# Patient Record
Sex: FEMALE
[De-identification: names, ages, dates, MRNs, and addresses within clinical notes are randomized per-mention and may not be internally consistent; named-entity substitution may affect disease eponyms.]

---

## 2022-05-03 ENCOUNTER — HOSPITAL ENCOUNTER (OUTPATIENT)
Dept: HOSPITAL 5 - TRG | Age: 22
Discharge: HOME | End: 2022-05-03
Attending: OBSTETRICS & GYNECOLOGY
Payer: SELF-PAY

## 2022-05-03 VITALS — DIASTOLIC BLOOD PRESSURE: 58 MMHG | SYSTOLIC BLOOD PRESSURE: 111 MMHG

## 2022-05-03 DIAGNOSIS — Z34.92: Primary | ICD-10-CM

## 2022-05-03 DIAGNOSIS — Z3A.20: ICD-10-CM

## 2022-05-03 LAB
BILIRUB UR QL STRIP: (no result)
BLOOD UR QL VISUAL: (no result)
PH UR STRIP: 8 [PH] (ref 5–7)
PROT UR STRIP-MCNC: (no result) MG/DL
UROBILINOGEN UR-MCNC: < 2 MG/DL (ref ?–2)
WBC #/AREA URNS HPF: < 1 /HPF (ref 0–6)

## 2022-05-03 PROCEDURE — 81001 URINALYSIS AUTO W/SCOPE: CPT

## 2022-05-03 PROCEDURE — 59025 FETAL NON-STRESS TEST: CPT

## 2022-08-15 ENCOUNTER — HOSPITAL ENCOUNTER (OUTPATIENT)
Dept: HOSPITAL 5 - TRG | Age: 22
Discharge: HOME | End: 2022-08-15
Attending: OBSTETRICS & GYNECOLOGY
Payer: SELF-PAY

## 2022-08-15 VITALS — SYSTOLIC BLOOD PRESSURE: 111 MMHG | DIASTOLIC BLOOD PRESSURE: 80 MMHG

## 2022-08-15 DIAGNOSIS — Z3A.35: ICD-10-CM

## 2022-08-15 DIAGNOSIS — O46.93: Primary | ICD-10-CM

## 2022-08-15 LAB
PH UR STRIP: 5 [PH] (ref 5–7)
PROT UR STRIP-MCNC: (no result) MG/DL
RBC #/AREA URNS HPF: 1 /HPF (ref 0–6)
UROBILINOGEN UR-MCNC: > 2 MG/DL (ref ?–2)
WBC #/AREA URNS HPF: 1 /HPF (ref 0–6)

## 2022-08-15 PROCEDURE — 76815 OB US LIMITED FETUS(S): CPT

## 2022-08-15 PROCEDURE — 81001 URINALYSIS AUTO W/SCOPE: CPT

## 2022-08-15 NOTE — ULTRASOUND REPORT
Limited abdominal ultrasound



INDICATION: Vaginal bleeding



FINDINGS: Single live intrauterine pregnancy in cephalic position. No abruption is seen. No lytic no 
separation of the placenta. Placenta is anterior left lateral. Grade 2. Fetal heart rate is 1 38 bpm.




IMPRESSION:

Single live intrauterine prior exam. No placental abruption is seen



Signer Name: Yunier Saldivar MD 

Signed: 8/15/2022 10:58 PM

Workstation Name: [facility]-

## 2022-08-25 ENCOUNTER — HOSPITAL ENCOUNTER (INPATIENT)
Dept: HOSPITAL 5 - TRG | Age: 22
LOS: 3 days | Discharge: HOME | End: 2022-08-28
Attending: OBSTETRICS & GYNECOLOGY | Admitting: OBSTETRICS & GYNECOLOGY
Payer: SELF-PAY

## 2022-08-25 DIAGNOSIS — F17.210: ICD-10-CM

## 2022-08-25 DIAGNOSIS — Z20.822: ICD-10-CM

## 2022-08-25 DIAGNOSIS — O34.211: ICD-10-CM

## 2022-08-25 DIAGNOSIS — Z3A.37: ICD-10-CM

## 2022-08-25 DIAGNOSIS — D72.829: ICD-10-CM

## 2022-08-25 LAB
HCT VFR BLD CALC: 27.1 % (ref 30.3–42.9)
HCT VFR BLD CALC: 36.2 % (ref 30.3–42.9)
HGB BLD-MCNC: 12.1 GM/DL (ref 10.1–14.3)
HGB BLD-MCNC: 9 GM/DL (ref 10.1–14.3)
MCHC RBC AUTO-ENTMCNC: 33 % (ref 30–34)
MCV RBC AUTO: 89 FL (ref 79–97)
PLATELET # BLD: 307 K/MM3 (ref 140–440)
RBC # BLD AUTO: 4.06 M/MM3 (ref 3.65–5.03)

## 2022-08-25 PROCEDURE — 36415 COLL VENOUS BLD VENIPUNCTURE: CPT

## 2022-08-25 PROCEDURE — 85018 HEMOGLOBIN: CPT

## 2022-08-25 PROCEDURE — 0KQM0ZZ REPAIR PERINEUM MUSCLE, OPEN APPROACH: ICD-10-PCS | Performed by: ADVANCED PRACTICE MIDWIFE

## 2022-08-25 PROCEDURE — 86901 BLOOD TYPING SEROLOGIC RH(D): CPT

## 2022-08-25 PROCEDURE — 86762 RUBELLA ANTIBODY: CPT

## 2022-08-25 PROCEDURE — U0003 INFECTIOUS AGENT DETECTION BY NUCLEIC ACID (DNA OR RNA); SEVERE ACUTE RESPIRATORY SYNDROME CORONAVIRUS 2 (SARS-COV-2) (CORONAVIRUS DISEASE [COVID-19]), AMPLIFIED PROBE TECHNIQUE, MAKING USE OF HIGH THROUGHPUT TECHNOLOGIES AS DESCRIBED BY CMS-2020-01-R: HCPCS

## 2022-08-25 PROCEDURE — 86850 RBC ANTIBODY SCREEN: CPT

## 2022-08-25 PROCEDURE — 86706 HEP B SURFACE ANTIBODY: CPT

## 2022-08-25 PROCEDURE — 85025 COMPLETE CBC W/AUTO DIFF WBC: CPT

## 2022-08-25 PROCEDURE — 88307 TISSUE EXAM BY PATHOLOGIST: CPT

## 2022-08-25 PROCEDURE — 85027 COMPLETE CBC AUTOMATED: CPT

## 2022-08-25 PROCEDURE — 80307 DRUG TEST PRSMV CHEM ANLYZR: CPT

## 2022-08-25 PROCEDURE — 86900 BLOOD TYPING SEROLOGIC ABO: CPT

## 2022-08-25 PROCEDURE — 85007 BL SMEAR W/DIFF WBC COUNT: CPT

## 2022-08-25 PROCEDURE — 85014 HEMATOCRIT: CPT

## 2022-08-25 PROCEDURE — 87806 HIV AG W/HIV1&2 ANTB W/OPTIC: CPT

## 2022-08-25 PROCEDURE — 86592 SYPHILIS TEST NON-TREP QUAL: CPT

## 2022-08-25 RX ADMIN — DOCUSATE SODIUM SCH MG: 100 CAPSULE, LIQUID FILLED ORAL at 21:00

## 2022-08-25 RX ADMIN — IBUPROFEN SCH MG: 800 TABLET, FILM COATED ORAL at 18:12

## 2022-08-25 NOTE — HISTORY AND PHYSICAL REPORT
History of Present Illness


Date of examination: 22


Date of admission: 


2022


Chief complaint: 


Intense Labor Pains








History of present illness: 


No Prenatal Care








Past History


Past Medical History: no pertinent history


Past Surgical History:  section (x2)


Family/Genetic History: heart disease (Mother)


Social history: single, smoking (3-4 Cigarettes daily; and smokes THC every 

other day)





- Obstetrical History


Expected Date of Delivery: 22


Actual Gestation: 37 Week(s) 1 Day(s) 


: 3


Para: 2


Hx # Term Pregnancies: 2


Number of Living Children: 2





Medications and Allergies


                                    Allergies











Allergy/AdvReac Type Severity Reaction Status Date / Time


 


No Known Allergies Allergy   Verified 08/15/22 15:10











                                Home Medications











 Medication  Instructions  Recorded  Confirmed  Last Taken  Type


 


No Known Home Medications [No  08/15/22 08/15/22 Unknown History





Reported Home Medications]     











Active Meds: 


Active Medications





Butorphanol Tartrate (Butorphanol 2 Mg/1 Ml Inj)  1 mg IV Q2H PRN


   PRN Reason: Pain, Moderate(4-6) LABOR PAIN


Ephedrine Sulfate (Ephedrine Sulfate 50 Mg/1 Ml Inj)  10 mg IV Q2M PRN


   PRN Reason: Hypotension


Lactated Ringer's (Lactated Ringers)  1,000 mls @ 125 mls/hr IV AS DIRECT ROSEMARIE


Oxytocin/Sodium Chloride (Pitocin/Ns 30 Unit/500ml)  30 units in 500 mls @ 40 

mls/hr IV TITR ROSEMARIE; Protocol


Cefazolin Sodium 2 gm/ Sodium (Chloride)  100 mls @ 200 mls/hr IV ONCE ONE; 

Protocol


   Stop: 22 11:31


Lidocaine (Lidocaine (2%) 20 Mg/1 Ml Vial 20 Ml Mdv)  20 ml INFILTRATI ONCE ONE


   Stop: 22 11:03


Methylergonovine Maleate (Methylergonovine Maleate 0.2 Mg/Ml Vial)  0.2 mg IM 

ONCE PRN


   PRN Reason: Uterine Bleeding


Mineral Oil (Mineral Oil 30 Ml Oral Liqd)  30 ml PO QHS PRN


   PRN Reason: Constipation


Misoprostol (Misoprostol 200 Mcg Tab)  800 mcg WA ONCE PRN


   PRN Reason: Uterine Bleeding


Oxytocin (Oxytocin 10 Unit/1 Ml Inj)  10 unit IM ONCE PRN


   PRN Reason: Uterine Bleeding


Terbutaline Sulfate (Terbutaline 1 Mg/1 Ml Inj)  0.25 mg SUB-Q ONCE PRN


   PRN Reason: Hyperstimulation/Hypertonicity











Review of Systems


Gastrointestinal: constipation





- Vital Signs


Vital signs: 


                                   Vital Signs











Pulse BP


 


 105 H  119/77 


 


 22 09:18  22 09:18








                                        











Temp Pulse Resp BP Pulse Ox


 


 98.3 F   75   16   113/56   97 


 


 22 10:20  22 11:10  22 10:20  22 11:07  22 11:10














- Physical Exam


Breasts: Positive: normal


Cardiovascular: Regular rate


Lungs: Positive: Clear to auscultation, Normal air movement


Abdomen: Positive: normal appearance, soft


Genitourinary (Female): Positive: normal external genitalia, normal perenium


Vagina: Positive: normal moisture


Uterus: Positive: enlarged


Anus/Rectum: Positive: normal perianal skin





- Obstetrical


FHR: other


Uterine Contraction Monitor Mode: External


Cervical Dilatation: 6 (leaking a small amount of clear fluid)


Cervical Effacement Percentage: 100


Fetal station: 0


Uterine Contraction Pattern: Regular


Uterine Tone Measurement Phase: Resting


Uterine Contraction Intensity: Strong/Firm





Results


All other labs normal.








Assessment and Plan


A: IUP @ 37 1/7 Weeks


     Active Labor


     SROM


     GBS Unknown


     No Prenatal Care


     Previous  x 2





P: Admit to L&D Per Routine Orders


    Walk-In Labs


    Ancef 2G X 1 dose


    Anticipate

## 2022-08-25 NOTE — PROCEDURE NOTE
OB Delivery Note





- Delivery


Date of Delivery: 22 (0938)


Surgeon: INDRA HOGAN


Estimated blood loss: 500cc





- Vaginal


Delivery presentation: vertex


Delivery position: OA


Intrapartum events: no prenatal care, mult.variable deceleratio


Delivery induction: none


Delivery monitor: external FHT, external uterine


Route of delivery: 


Delivery placenta: spontaneous, uterine exploration


Delivery cord: 3 umbilical vessels


Episiotomy: none


Delivery laceration: 2nd degree, vaginal side wall


Delivery repair: vicryl


Anesthesia: local


Delivery comments: 


 of a live 6'6 male infant over bilateral 2nd degree vaginal lacerations and

a right labial laceration without pain control with Apgars of 8 and 9 at 0938 on

2022.  Infant directly to maternal abd/chest, skin to skin contact.  

Spontaneous delivery of placenta with [place] side presenting at 0943.  Delayed 

cord clamping and cutting; Cord cut by the Father of the Baby.  Large amount of 

trailing membranes removed with ring forceps.  Uterine exploration; several 

other trailing membranes and large blood clots removed from lower uterine 

segment.  800mcg of Cytotec placed per rectum, Methergine 0.2mg given IM, and 

Vigorous external uterine massage, and Fundus is firm and midline and located 4 

below the U. Labial and vaginal wall lacerations repaired with 3-0 and 2-0 

Vicryl on a CT-1 under local 2% Lidocaine.  Bladder emptied with In and Out 

Catheter (100cc of light yellow urine voided). Lochia is scant. Cord blood 

collected. Placenta to Pathology. 








- Infant


  ** A


Apgar at 1 minute: 8


Apgar at 5 minutes: 9


Infant Gender: Male (6'6)

## 2022-08-26 LAB
BASOPHILS # (AUTO): 0 K/MM3 (ref 0–0.1)
BASOPHILS NFR BLD AUTO: 0.2 % (ref 0–1.8)
BENZODIAZEPINES SCREEN,URINE: (no result)
EOSINOPHIL # BLD AUTO: 0.1 K/MM3 (ref 0–0.4)
EOSINOPHIL NFR BLD AUTO: 0.6 % (ref 0–4.3)
HCT VFR BLD CALC: 26.4 % (ref 30.3–42.9)
HGB BLD-MCNC: 8.7 GM/DL (ref 10.1–14.3)
LYMPHOCYTES # BLD AUTO: 2.6 K/MM3 (ref 1.2–5.4)
LYMPHOCYTES NFR BLD AUTO: 14.8 % (ref 13.4–35)
MCHC RBC AUTO-ENTMCNC: 33 % (ref 30–34)
MCV RBC AUTO: 89 FL (ref 79–97)
METHADONE SCREEN,URINE: (no result)
MONOCYTES # (AUTO): 0.9 K/MM3 (ref 0–0.8)
MONOCYTES % (AUTO): 5.1 % (ref 0–7.3)
OPIATE SCREEN,URINE: (no result)
PLATELET # BLD: 229 K/MM3 (ref 140–440)
RBC # BLD AUTO: 2.96 M/MM3 (ref 3.65–5.03)

## 2022-08-26 RX ADMIN — Medication SCH EACH: at 11:27

## 2022-08-26 RX ADMIN — IBUPROFEN SCH MG: 800 TABLET, FILM COATED ORAL at 00:25

## 2022-08-26 RX ADMIN — AMOXICILLIN AND CLAVULANATE POTASSIUM SCH EACH: 875; 125 TABLET, FILM COATED ORAL at 16:53

## 2022-08-26 RX ADMIN — FERROUS SULFATE TAB 325 MG (65 MG ELEMENTAL FE) SCH MG: 325 (65 FE) TAB at 16:54

## 2022-08-26 RX ADMIN — DOCUSATE SODIUM SCH MG: 100 CAPSULE, LIQUID FILLED ORAL at 11:27

## 2022-08-26 RX ADMIN — IBUPROFEN SCH MG: 800 TABLET, FILM COATED ORAL at 11:27

## 2022-08-26 NOTE — PROGRESS NOTE
Assessment and Plan


PPD#1  with mild fundal tenderness, afebrile with leucocytosis and hgb 

decreasing and pt asymptomatic.


1. Give Augmentin bid


2. Repeat CBC in AM and start ferrous sulfate bid now


3. Routine postpartum care


All questions encouraged and answered





Subjective


Date of service: 22


Principal diagnosis: PPD#1  with prev c/s x2


Interval history: 


pt has no complaints.  Vag bleed less than a period and pain controlled with 

meds.  pt is breast feeding. 





Objective





- Constitutional


Vitals: 


                               Vital Signs - 12hr











  22





  04:13 08:20


 


Temperature 98.0 F 98.1 F


 


Pulse Rate 82 70


 


Respiratory 19 18





Rate  


 


Blood Pressure 116/62 105/50


 


O2 Sat by Pulse 100 100





Oximetry  











General appearance: Present: no acute distress





- Neck


Neck: normal ROM





- Respiratory


Respiratory effort: normal





- Breasts


Breasts: deferred





- Cardiovascular


Rhythm: regular


Extremities: No edema





- Gastrointestinal


General gastrointestinal: Present: soft, non-tender





- Genitourinary


Female genitourinary: other (fundus with mild tenderness, and 1cm below 

umbilicus; lochia moderate)





- Integumentary


Integumentary: warm, dry





- Neurologic


Neurologic: moves all extremities





- Psychiatric


Psychiatric: cooperative





- Labs


CBC & Chem 7: 


                                 22 09:52





Labs: 


                              Abnormal lab results











  22 Range/Units





  11:02 22:53 09:52 


 


WBC  17.1 H   17.7 H  (4.5-11.0)  K/mm3


 


RBC    2.96 L  (3.65-5.03)  M/mm3


 


Hgb   9.0 L D  8.7 L  (10.1-14.3)  gm/dl


 


Hct   27.1 L D  26.4 L  (30.3-42.9)  %


 


Mono # (Auto)    0.9 H  (0.0-0.8)  K/mm3


 


Seg Neutrophils %    79.3 H  (40.0-70.0)  %


 


Seg Neutrophils #    14.0 H  (1.8-7.7)  K/mm3














Medications & Allergies





- Medications


Allergies/Adverse Reactions: 


                                    Allergies





No Known Allergies Allergy (Verified 08/15/22 15:10)


   








Home Medications: 


                                Home Medications











 Medication  Instructions  Recorded  Confirmed  Last Taken  Type


 


No Known Home Medications [No  08/15/22 08/15/22 Unknown History





Reported Home Medications]     











Active Medications: 














Generic Name Dose Route Start Last Admin





  Trade Name Freq  PRN Reason Stop Dose Admin


 


Hydrocodone Bitart/Acetaminophen  2 each  22 11:08 





  Hydrocodone/Acetaminophen 5-325 Mg Tab  PO  





  Q6H PRN  





  Pain, Moderate (4-6)  


 


Benzocaine/Menthol  1 spray  22 11:08  22 20:00





  Benzocaine/Menthol 20/0.5% Top Spray 56 Gm  TP   1 spray





  PRN PRN   Administration





  Episiotomy Pain  


 


Bisacodyl  10 mg  22 11:08 





  Bisacodyl 10 Mg Rect Supp  MS  





  BID PRN  





  Constipation  


 


Diphenhydramine HCl  25 mg  22 11:08 





  Diphenhydramine 25 Mg Cap  PO  





  Q6H PRN  





  Itching  


 


Docusate Sodium  100 mg  22 22:00  22 11:27





  Docusate Sodium 100 Mg Cap  PO   100 mg





  BID ROSEMARIE   Administration


 


Ibuprofen  800 mg  22 12:00  22 11:27





  Ibuprofen 800 Mg Tab  PO   800 mg





  Q6H ROSEMARIE   Administration


 


Magnesium Hydroxide  30 ml  22 11:08 





  Magnesium Hydroxide (Mom) Oral Liqd Udc  PO  





  HS PRN  





  Constipation  


 


Multi-Ingredient Ointment  1 applic  22 11:08 





  Lanolin/Zinc/Dimethicone (Lansinoh) 7 Gm  TP  





  PRN PRN  





  Sore Nipples  


 


Multivitamins/Iron/Calcium  1 each  22 10:00  22 11:27





  Prenatal Zip38-Ty Fumarate-Folic Acid Vit Tab  PO   1 each





  QDAY ROSEMARIE   Administration


 


Promethazine HCl  25 mg  22 11:08 





  Promethazine 25 Mg Rect Supp  MS  





  Q6H PRN  





  Nausea And Vomiting  


 


Sodium Chloride  10 ml  22 12:00 





  Sodium Chloride 0.9% 10 Ml Flush Syringe  IV  





  PRN ROSEMARIE  


 


Witch Hazel/Glycerin  1 each  22 11:08  22 20:00





  Witch Hazel/ Glycerin Pad  TP   1 each





  PRN PRN   Administration





  Hemorrhoid/cleansing/soothing

## 2022-08-27 LAB
ANISOCYTOSIS BLD QL SMEAR: (no result)
BAND NEUTROPHILS # (MANUAL): 0 K/MM3
HCT VFR BLD CALC: 27 % (ref 30.3–42.9)
HGB BLD-MCNC: 8.8 GM/DL (ref 10.1–14.3)
MCHC RBC AUTO-ENTMCNC: 33 % (ref 30–34)
MCV RBC AUTO: 92 FL (ref 79–97)
MYELOCYTES # (MANUAL): 0 K/MM3
PLATELET # BLD: 280 K/MM3 (ref 140–440)
PROMYELOCYTES # (MANUAL): 0 K/MM3
RBC # BLD AUTO: 2.95 M/MM3 (ref 3.65–5.03)
TOTAL CELLS COUNTED BLD: 100

## 2022-08-27 RX ADMIN — AMOXICILLIN AND CLAVULANATE POTASSIUM SCH EACH: 875; 125 TABLET, FILM COATED ORAL at 10:29

## 2022-08-27 RX ADMIN — Medication SCH EACH: at 10:29

## 2022-08-27 RX ADMIN — IBUPROFEN SCH MG: 800 TABLET, FILM COATED ORAL at 22:14

## 2022-08-27 RX ADMIN — DOCUSATE SODIUM SCH MG: 100 CAPSULE, LIQUID FILLED ORAL at 10:29

## 2022-08-27 RX ADMIN — AMOXICILLIN AND CLAVULANATE POTASSIUM SCH EACH: 875; 125 TABLET, FILM COATED ORAL at 22:13

## 2022-08-27 RX ADMIN — IBUPROFEN SCH: 800 TABLET, FILM COATED ORAL at 18:03

## 2022-08-27 RX ADMIN — FERROUS SULFATE TAB 325 MG (65 MG ELEMENTAL FE) SCH MG: 325 (65 FE) TAB at 02:29

## 2022-08-27 RX ADMIN — FERROUS SULFATE TAB 325 MG (65 MG ELEMENTAL FE) SCH MG: 325 (65 FE) TAB at 22:14

## 2022-08-27 RX ADMIN — DOCUSATE SODIUM SCH MG: 100 CAPSULE, LIQUID FILLED ORAL at 22:12

## 2022-08-27 RX ADMIN — IBUPROFEN SCH MG: 800 TABLET, FILM COATED ORAL at 08:44

## 2022-08-27 RX ADMIN — AMOXICILLIN AND CLAVULANATE POTASSIUM SCH EACH: 875; 125 TABLET, FILM COATED ORAL at 02:28

## 2022-08-27 RX ADMIN — IBUPROFEN SCH: 800 TABLET, FILM COATED ORAL at 15:20

## 2022-08-27 RX ADMIN — FERROUS SULFATE TAB 325 MG (65 MG ELEMENTAL FE) SCH MG: 325 (65 FE) TAB at 10:30

## 2022-08-27 RX ADMIN — DOCUSATE SODIUM SCH MG: 100 CAPSULE, LIQUID FILLED ORAL at 02:29

## 2022-08-27 RX ADMIN — IBUPROFEN SCH MG: 800 TABLET, FILM COATED ORAL at 02:28

## 2022-08-27 RX ADMIN — IBUPROFEN SCH: 800 TABLET, FILM COATED ORAL at 23:00

## 2022-08-27 NOTE — PROGRESS NOTE
Assessment and Plan





A: PPD # 2 - elevated WBC's


    On Augmentin


P: CBC pending


    Continue PP Care





Subjective





- Subjective


Date of service: 22


Principal diagnosis: PPD#2  with prev c/s x2


Patient reports: appetite normal


[place]: doing well





Objective





- Vital Signs


Latest vital signs: 


                                   Vital Signs











  Temp Pulse Resp BP Pulse Ox Pulse Ox


 


 22 23:57  98.1 F  88  18  103/63  100 


 


 22 19:45       99


 


 22 18:22  98.0 F  77  18  102/45  99 


 


 22 16:51       100


 


 22 08:20  98.1 F  70  18  105/50  100 








                                Intake and Output











 22





 14:59 22:59 06:59


 


Intake Total  200 360


 


Balance  200 360


 


Intake:   


 


  Oral  200 


 


  Intake, Free Water   360


 


Other:   


 


  Total, Intake Amount  200 


 


  # Voids   


 


    Void  1 1














- Exam


Breasts: Present: deferred


Cardiovascular: Present: Regular rate


Lungs: Present: Clear to auscultation


Abdomen: Present: soft


Uterus: Present: fundal height below umbilicus


Extremities: Present: normal


Deep Tendon Reflex Grade: Normal +2





- Labs


Labs: 


                              Abnormal lab results











  22 Range/Units





  09:52 


 


WBC  17.7 H  (4.5-11.0)  K/mm3


 


RBC  2.96 L  (3.65-5.03)  M/mm3


 


Hgb  8.7 L  (10.1-14.3)  gm/dl


 


Hct  26.4 L  (30.3-42.9)  %


 


Mono # (Auto)  0.9 H  (0.0-0.8)  K/mm3


 


Seg Neutrophils %  79.3 H  (40.0-70.0)  %


 


Seg Neutrophils #  14.0 H  (1.8-7.7)  K/mm3

## 2022-08-28 VITALS — SYSTOLIC BLOOD PRESSURE: 111 MMHG | DIASTOLIC BLOOD PRESSURE: 55 MMHG

## 2022-08-28 LAB
ANISOCYTOSIS BLD QL SMEAR: (no result)
BAND NEUTROPHILS # (MANUAL): 0.1 K/MM3
HCT VFR BLD CALC: 23.8 % (ref 30.3–42.9)
HGB BLD-MCNC: 7.8 GM/DL (ref 10.1–14.3)
MCHC RBC AUTO-ENTMCNC: 33 % (ref 30–34)
MCV RBC AUTO: 90 FL (ref 79–97)
MYELOCYTES # (MANUAL): 0.1 K/MM3
PLATELET # BLD: 251 K/MM3 (ref 140–440)
PROMYELOCYTES # (MANUAL): 0 K/MM3
RBC # BLD AUTO: 2.64 M/MM3 (ref 3.65–5.03)
TOTAL CELLS COUNTED BLD: 100

## 2022-08-28 RX ADMIN — Medication SCH EACH: at 10:30

## 2022-08-28 RX ADMIN — FERROUS SULFATE TAB 325 MG (65 MG ELEMENTAL FE) SCH MG: 325 (65 FE) TAB at 10:30

## 2022-08-28 RX ADMIN — IBUPROFEN SCH MG: 800 TABLET, FILM COATED ORAL at 05:11

## 2022-08-28 RX ADMIN — AMOXICILLIN AND CLAVULANATE POTASSIUM SCH EACH: 875; 125 TABLET, FILM COATED ORAL at 10:31

## 2022-08-28 RX ADMIN — DOCUSATE SODIUM SCH MG: 100 CAPSULE, LIQUID FILLED ORAL at 10:31

## 2022-08-28 NOTE — DISCHARGE SUMMARY
Providers





- Providers


Date of Admission: 


22 08:50





Date of discharge: 22


Attending physician: 


EDWARD WAYNE





                                        





22 08:00


Consult to Case Management [CONS] Routine 


   Services Needed at Discharge: 


   Notified:: n/a


   Additional Physician Instructions: UDS positive for THC


                                        no prenatal care











Primary care physician: 


EDWARD WAYNE








Hospitalization


Delivery: 


Episiotomy: none


Laceration: 2nd degree (vaginal), other (labial)


Other postpartum procedures: none


Postpartum complications: none


Discharge diagnosis: IUP at term delivered


 baby: male


Condition at discharge: Good


Disposition:  HOME / SELF CARE / HOMELESS





Plan





- Discharge Medications


Prescriptions: 


Amoxicillin/K Clav Tab [Augmentin 875MG TAB] 1 each PO Q12HR 5 Days #10 tablet


Ibuprofen [Motrin 800 MG tab] 800 mg PO Q8HR #30 tablet





- Provider Discharge Summary


Activity: no sex for 6 weeks, no heavy lifting 4 weeks, no strenuous exercise


Diet: routine


Instructions: routine


Additional instructions: 


[]  Smoking cessation referral if applicable(refer to patient education folder 

for contact #)


[]  Refer to [facility] Booklet








Call your doctor immediately for:


* Fever > 100.5


* Heavy vaginal bleeding ( >1 pad per hour)


* Severe persistent headache


* Shortness of breath


* Reddened, hot, painful area to leg or breast


* Drainage or odor from incision.





* Keep incision clean and dry at all times and follow doctor's instructions 

regarding bathing/showering











- Follow up plan


Follow up: 


EDWARD WAYNE MD [Primary Care Provider] - 6 Weeks